# Patient Record
Sex: MALE | Race: BLACK OR AFRICAN AMERICAN | URBAN - METROPOLITAN AREA
[De-identification: names, ages, dates, MRNs, and addresses within clinical notes are randomized per-mention and may not be internally consistent; named-entity substitution may affect disease eponyms.]

---
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
## 2021-01-15 ENCOUNTER — OFFICE VISIT (OUTPATIENT)
Dept: PRIMARY CARE CLINIC | Age: 21
End: 2021-01-15
Payer: MEDICARE

## 2021-01-15 VITALS
HEART RATE: 72 BPM | DIASTOLIC BLOOD PRESSURE: 70 MMHG | BODY MASS INDEX: 25.91 KG/M2 | RESPIRATION RATE: 16 BRPM | TEMPERATURE: 98 F | HEIGHT: 73 IN | WEIGHT: 195.5 LBS | SYSTOLIC BLOOD PRESSURE: 122 MMHG

## 2021-01-15 DIAGNOSIS — F51.01 PRIMARY INSOMNIA: ICD-10-CM

## 2021-01-15 DIAGNOSIS — Z71.3 DIETARY COUNSELING: ICD-10-CM

## 2021-01-15 DIAGNOSIS — Z00.01 ENCOUNTER FOR WELL ADULT EXAM WITH ABNORMAL FINDINGS: Primary | ICD-10-CM

## 2021-01-15 DIAGNOSIS — Z71.82 EXERCISE COUNSELING: ICD-10-CM

## 2021-01-15 DIAGNOSIS — Z01.10 HEARING EXAM WITHOUT ABNORMAL FINDINGS: ICD-10-CM

## 2021-01-15 DIAGNOSIS — B35.1 ONYCHOMYCOSIS: ICD-10-CM

## 2021-01-15 DIAGNOSIS — Z11.59 ENCOUNTER FOR HEPATITIS C SCREENING TEST FOR LOW RISK PATIENT: ICD-10-CM

## 2021-01-15 DIAGNOSIS — Z11.3 ROUTINE SCREENING FOR STI (SEXUALLY TRANSMITTED INFECTION): ICD-10-CM

## 2021-01-15 DIAGNOSIS — Z23 NEED FOR VACCINATION: ICD-10-CM

## 2021-01-15 LAB — HEPATITIS C ANTIBODY INTERPRETATION: NORMAL

## 2021-01-15 PROCEDURE — G8482 FLU IMMUNIZE ORDER/ADMIN: HCPCS | Performed by: PEDIATRICS

## 2021-01-15 PROCEDURE — 92552 PURE TONE AUDIOMETRY AIR: CPT | Performed by: PEDIATRICS

## 2021-01-15 PROCEDURE — 36415 COLL VENOUS BLD VENIPUNCTURE: CPT | Performed by: PEDIATRICS

## 2021-01-15 PROCEDURE — 99395 PREV VISIT EST AGE 18-39: CPT | Performed by: PEDIATRICS

## 2021-01-15 PROCEDURE — 90471 IMMUNIZATION ADMIN: CPT | Performed by: PEDIATRICS

## 2021-01-15 PROCEDURE — 90686 IIV4 VACC NO PRSV 0.5 ML IM: CPT | Performed by: PEDIATRICS

## 2021-01-15 SDOH — SOCIAL STABILITY: SOCIAL NETWORK: ARE YOU MARRIED, WIDOWED, DIVORCED, SEPARATED, NEVER MARRIED, OR LIVING WITH A PARTNER?: NEVER MARRIED

## 2021-01-15 SDOH — HEALTH STABILITY: PHYSICAL HEALTH: ON AVERAGE, HOW MANY MINUTES DO YOU ENGAGE IN EXERCISE AT THIS LEVEL?: 60 MIN

## 2021-01-15 SDOH — SOCIAL STABILITY: SOCIAL NETWORK: HOW OFTEN DO YOU GET TOGETHER WITH FRIENDS OR RELATIVES?: MORE THAN THREE TIMES A WEEK

## 2021-01-15 SDOH — SOCIAL STABILITY: SOCIAL NETWORK: HOW OFTEN DO YOU ATTENT MEETINGS OF THE CLUB OR ORGANIZATION YOU BELONG TO?: NEVER

## 2021-01-15 SDOH — SOCIAL STABILITY: SOCIAL NETWORK
IN A TYPICAL WEEK, HOW MANY TIMES DO YOU TALK ON THE PHONE WITH FAMILY, FRIENDS, OR NEIGHBORS?: MORE THAN THREE TIMES A WEEK

## 2021-01-15 ASSESSMENT — PATIENT HEALTH QUESTIONNAIRE - PHQ9
SUM OF ALL RESPONSES TO PHQ QUESTIONS 1-9: 4
9. THOUGHTS THAT YOU WOULD BE BETTER OFF DEAD, OR OF HURTING YOURSELF: 0
SUM OF ALL RESPONSES TO PHQ QUESTIONS 1-9: 4
7. TROUBLE CONCENTRATING ON THINGS, SUCH AS READING THE NEWSPAPER OR WATCHING TELEVISION: 0
8. MOVING OR SPEAKING SO SLOWLY THAT OTHER PEOPLE COULD HAVE NOTICED. OR THE OPPOSITE, BEING SO FIGETY OR RESTLESS THAT YOU HAVE BEEN MOVING AROUND A LOT MORE THAN USUAL: 0
3. TROUBLE FALLING OR STAYING ASLEEP: 3
SUM OF ALL RESPONSES TO PHQ QUESTIONS 1-9: 4
6. FEELING BAD ABOUT YOURSELF - OR THAT YOU ARE A FAILURE OR HAVE LET YOURSELF OR YOUR FAMILY DOWN: 0
SUM OF ALL RESPONSES TO PHQ9 QUESTIONS 1 & 2: 1

## 2021-01-15 NOTE — PATIENT INSTRUCTIONS

## 2021-01-15 NOTE — PROGRESS NOTES
SUBJECTIVE:    Cindy Shah is a 24 y.o. male is being seen today for a  Preventive visit. Patient needs to come in for a visit before additional refills are given. His mother brought him to this appointment but he gave 100% of the history by himself    Chief Complaint   Patient presents with    Annual Exam     patient concerned toe nails keep falling off       Lipids normal 2018, normal HIV same day  Significant life event this year: 27year old sister  of seizure complications. His mother is still very depressed about it. Jimi has interacted with her children, tries to fill in the void. Another niece is living with them. Jimi graduated from [employer] in , currently working 40 hrs/week, usually 230pm to 1030pm. He wakes up at 8-9am. He has trouble going to sleep, feels \"up\" all the time. He does not drik anything with caffeine in it. He gave up smoking a week ago. He exercises twice a week but does a lot of lifting at work  He might go to college once he figures out what he wants to do with his life    He has not made any changes in his diet. He does not drink milk. Mom does most of the cooking at home    He is not in a current relationship. He identifies as straight/male. He has had 8 lifetime partners. He uses a condom. He had chlamydia in 2019, denies discharge, burning, or hematuria. Current depression is related to his sister's death, but the depression is lifting. 1. In the PAST YEAR, have you felt depressed or sad most days, even if you felt okay sometimes? YES    2. If you are experiencing any of the problems on this form [PHQ-9], how DIFFICULT have these problems made it for you to do your work, take care of things at home or get along with other people? NOT DIFFICULT AT ALL    3. Has there been a time in the PAST MONTH when you have had serious thoughts about ending your life? NO    4. Have you EVER in your WHOLE LIFE, tried to kill yourself or made a suicide attempt?  NO PHQ-9  1/15/2021   Little interest or pleasure in doing things 1   Feeling down, depressed, or hopeless 0   Trouble falling or staying asleep, or sleeping too much 3   Feeling tired or having little energy 0   Poor appetite or overeating 0   Feeling bad about yourself - or that you are a failure or have let yourself or your family down 0   Trouble concentrating on things, such as reading the newspaper or watching television 0   Moving or speaking so slowly that other people could have noticed. Or the opposite - being so fidgety or restless that you have been moving around a lot more than usual 0   Thoughts that you would be better off dead, or of hurting yourself in some way 0   PHQ-2 Score 1   PHQ-9 Total Score 4           Patient Active Problem List   Diagnosis    Acquired left flat foot    Advanced bone age   Shaheen Re Allergic rhinitis    ADHD (attention deficit hyperactivity disorder), combined type    Gait abnormality    Other acne    Posterior tibial tendon dysfunction (PTTD) of both lower extremities    Pronation of both feet      No current outpatient medications on file prior to visit. No current facility-administered medications on file prior to visit. Past Medical History:   Diagnosis Date    Osgood-Schlatter's disease 7/3/2012     No past surgical history on file.    Social History     Socioeconomic History    Marital status: Single     Spouse name: NA    Number of children: 0    Years of education: 15    Highest education level: High school graduate   Occupational History    Occupation: food prep     Employer: [employer]   Social Needs    Financial resource strain: Not hard at all   Tony-Johanny insecurity     Worry: Never true     Inability: Never true    Transportation needs     Medical: No     Non-medical: No   Tobacco Use    Smoking status: Former Smoker     Types: Cigarettes    Smokeless tobacco: Former User     Quit date: 1/8/2021   Substance and Sexual Activity  Alcohol use: Yes     Alcohol/week: 2.0 standard drinks     Types: 2 Shots of liquor per week     Frequency: Monthly or less     Drinks per session: 1 or 2     Binge frequency: Never     Comment: Jimi drinks at family gatherings only    Drug use: Not Currently     Comment: quit marijuana use    Sexual activity: Yes     Partners: Female     Birth control/protection: Condom     Comment: partners have used birth control   Lifestyle    Physical activity     Days per week: 7 days     Minutes per session: 60 min    Stress: Only a little   Relationships    Social connections     Talks on phone: More than three times a week     Gets together: More than three times a week     Attends Anglican service: Not on file     Active member of club or organization: No     Attends meetings of clubs or organizations: Never     Relationship status: Never     Intimate partner violence     Fear of current or ex partner: No     Emotionally abused: No     Physically abused: No     Forced sexual activity: No   Other Topics Concern    Not on file   Social History Narrative    Not on file     No family history on file. No Known Allergies  Immunizations reviewed and are up-to-date. He is due for influenza vaccine. Vision and Hearing Screening:  Hearing Screening  Edited by: Judie Leger MA      125hz 250hz 500hz 1000hz 2000hz 3000hz 4000hz 6000hz 8000hz    Right ear   30 20 20 20 20 20 20    Left ear   30 20 20 20 20 20 20    Comments: Pure tone audiometer      Vision Screening  Edited by: Judie Leger MA      Right eye Left eye Both eyes    Comments: Wears glasses         Hearing Screening on 8/13/2019  Edited by: Judie Leger MA      125hz 250hz 500hz 1000hz 2000hz 3000hz 4000hz 6000hz 8000hz    Right ear   20 20 20  20      Left ear   20 20 20  20      Method:  Audiometry      Vision Screening on 8/13/2019  Edited by: Judie Leger MA      Right eye Left eye Both eyes    Comments: Wears glasses Review of System:   Review of Systems   Constitutional: Negative for fatigue and fever. HENT: Negative for congestion, dental problem, ear pain, nosebleeds, rhinorrhea, sinus pain, sore throat and trouble swallowing. Eyes: Negative for discharge and itching. Respiratory: Negative for choking and chest tightness. Cardiovascular: Negative for chest pain, palpitations and leg swelling. Gastrointestinal: Negative for abdominal distention, abdominal pain, constipation and diarrhea. Genitourinary: Negative for discharge, dysuria, frequency, hematuria, scrotal swelling and testicular pain. Musculoskeletal: Negative for arthralgias, back pain, gait problem, joint swelling, myalgias, neck pain and neck stiffness. Skin: Negative for rash. Allergic/Immunologic: Negative for environmental allergies and food allergies. Neurological: Negative for dizziness, syncope and headaches. Hematological: Negative for adenopathy. Does not bruise/bleed easily. Psychiatric/Behavioral: Negative for decreased concentration, hallucinations, sleep disturbance and suicidal ideas. The patient is not nervous/anxious. OBJECTIVE:  /70 (Site: Left Upper Arm, Position: Sitting, Cuff Size: Medium Adult)   Pulse 72   Temp 98 °F (36.7 °C) (Infrared)   Resp 16   Ht 6' 0.5\" (1.842 m)   Wt 195 lb 8 oz (88.7 kg)   BMI 26.15 kg/m²    Physical Exam  Vitals signs reviewed. Constitutional:       General: He is not in acute distress. Appearance: Normal appearance. He is well-developed and normal weight. HENT:      Head: Normocephalic and atraumatic. Right Ear: Tympanic membrane and ear canal normal.      Left Ear: Tympanic membrane and ear canal normal.      Nose: Nose normal.      Mouth/Throat:      Mouth: Mucous membranes are moist.      Pharynx: No oropharyngeal exudate. Comments: Normal dentition  Eyes:      General: No scleral icterus. Right eye: No discharge. Left eye: No discharge. Conjunctiva/sclera: Conjunctivae normal.      Pupils: Pupils are equal, round, and reactive to light. Neck:      Musculoskeletal: Normal range of motion and neck supple. Thyroid: No thyromegaly. Trachea: No tracheal deviation. Cardiovascular:      Rate and Rhythm: Normal rate and regular rhythm. Heart sounds: Normal heart sounds. No murmur. No friction rub. No gallop. Pulmonary:      Effort: Pulmonary effort is normal.      Breath sounds: Normal breath sounds. No wheezing or rales. Abdominal:      General: Bowel sounds are normal. There is no distension. Palpations: Abdomen is soft. There is no mass. Tenderness: There is no abdominal tenderness. There is no guarding or rebound. Hernia: No hernia is present. There is no hernia in the left inguinal area. Genitourinary:     Penis: Normal and circumcised. No discharge. Testes: Normal. Cremasteric reflex is present. Comments: Alex Stage V  Musculoskeletal: Normal range of motion. General: Deformity present. No signs of injury. Comments: Jimi has flat feet. There is a surgical scar of the left foot. He has bilateral hallux valgus. Lymphadenopathy:      Cervical: No cervical adenopathy. Lower Body: No right inguinal adenopathy. No left inguinal adenopathy. Skin:     General: Skin is warm and dry. Capillary Refill: Capillary refill takes less than 2 seconds. Findings: No erythema or rash. Comments: Nails of the great toe are hypertrophic with ridges, growing back. He has similar changes on the other toes. There is no maceration between toes, or peeling of the skin of the feet. Neurological:      Mental Status: He is alert and oriented to person, place, and time. Cranial Nerves: No cranial nerve deficit. Motor: No abnormal muscle tone.       Coordination: Coordination normal.   Psychiatric:         Mood and Affect: Mood normal. Behavior: Behavior normal.         Thought Content: Thought content normal.         Judgment: Judgment normal.          ASSESSMENT/PLAN:  1. Encounter for well adult exam with abnormal findings  Overall, Jimi Hill is doing very well in work/social/behavioral areas. He is adapting healthier habits (exercise, not smoking, eating better)    2. Onychomycosis  Advised that the treatment could be oral antifungal, but there is a risk of hepatotoxicity. Alternatively, he could try a topical treatment, but that may take a year to heal. Patient states he will think about it    3. Primary insomnia  Will assess thyroid status. If thyroid tests are normal, he should consider melatonin and will review sleep hygiene in more detail.  - TSH with Reflex    4. Hearing exam without abnormal findings  No concerns, rescreen in about 3 years    5. Routine screening for STI (sexually transmitted infection)  Discussed importance of condom, contraception. Asymptomatic for chlamydia and gonorrhea. - HIV Screen  - RPR    6. Encounter for hepatitis C screening test for low risk patient  No concerns, routine screening  - HEPATITIS C ANTIBODY    7. BMI 26.0-26.9,adult  He is mildly overweight by BMI, but he is muscular    8. Dietary counseling  Advised to take vitamin D since he gets almost none in his diet    9. Exercise counseling  Keep up daily exercise routine       Preventive Plan/anticipatory guidance: Discussed the following with patient and parent(s)/guardian and educational materials provided:    I counseled the patient about influenza vaccines, including effectiveness, side effects, and the diseases they prevent. The patient had the opportunity to ask questions and share in the decision to vaccinate. Nutrition/feeding- eat 5 fruits/veg daily, limit fried foods, fast food, junk food and sugary drinks, Drink water or fat free milk (20-24 ounces daily to get recommended calcium), Participate in > 1 hour of physical activity or active play daily,  Effects of second hand smoke,  Safety in the car: Seatbelt use, never enter car if  is under the influence of alcohol or drugs, once one earns their license: never using phone/texting while driving, Importance of caring/supportive relationships with family and friends, Importance of reporting bullying, stalking, abuse, and any threat to one's safety ASAP, Importance of appropriate sleep amount and sleep hygiene,  Importance of responsibility with school work; impact on one's future, Conflict resolution should always be non-violent, Internet safety and cyberbullying, Hearing protection at loud concerts to prevent permanent hearing loss,  Proper dental care. If no fluoride in water, need for oral fluoride supplementation, Signs of depression and anxiety; Importance of reaching out for help if one ever develops these signs, Smoke alarms/carbon monoxide detectors and Firearms safety: parents keep firearms locked up and unloaded     Return in about 1 year (around 1/15/2022) for preventive checkup.

## 2021-01-16 LAB
HIV AG/AB: NORMAL
HIV ANTIGEN: NORMAL
HIV-1 ANTIBODY: NORMAL
HIV-2 AB: NORMAL
TOTAL SYPHILLIS IGG/IGM: NORMAL
TSH REFLEX: 0.42 UIU/ML (ref 0.43–4)

## 2021-01-17 ASSESSMENT — ENCOUNTER SYMPTOMS
TROUBLE SWALLOWING: 0
RHINORRHEA: 0
SINUS PAIN: 0
ABDOMINAL PAIN: 0
CONSTIPATION: 0
EYE ITCHING: 0
EYE DISCHARGE: 0
DIARRHEA: 0
BACK PAIN: 0
CHOKING: 0
ABDOMINAL DISTENTION: 0
SORE THROAT: 0
CHEST TIGHTNESS: 0

## 2021-01-30 DIAGNOSIS — R94.6 ABNORMAL THYROID FUNCTION TEST: Primary | ICD-10-CM

## 2021-01-30 NOTE — RESULT ENCOUNTER NOTE
The following message was sent to the patient/family through [address]:  He needs to go to a [facility] lab to get T4 and TSH. The T4 should have been run since the TSH was low, but it was not. I have entered the order into [employer]. Your recent lab results are not normal. Your thyroid level may be low. You should have another blood test to check your thyroid status.